# Patient Record
Sex: MALE | Race: WHITE | NOT HISPANIC OR LATINO | URBAN - METROPOLITAN AREA
[De-identification: names, ages, dates, MRNs, and addresses within clinical notes are randomized per-mention and may not be internally consistent; named-entity substitution may affect disease eponyms.]

---

## 2024-09-12 ENCOUNTER — OFFICE VISIT (OUTPATIENT)
Dept: UROLOGY | Facility: CLINIC | Age: 24
End: 2024-09-12
Payer: MEDICAID

## 2024-09-12 VITALS
SYSTOLIC BLOOD PRESSURE: 123 MMHG | HEIGHT: 66 IN | BODY MASS INDEX: 30.53 KG/M2 | RESPIRATION RATE: 16 BRPM | WEIGHT: 190 LBS | HEART RATE: 85 BPM | DIASTOLIC BLOOD PRESSURE: 84 MMHG

## 2024-09-12 DIAGNOSIS — N50.819 PAIN IN TESTICLE, UNSPECIFIED LATERALITY: Primary | ICD-10-CM

## 2024-09-12 LAB
BILIRUBIN, UA POC OHS: NEGATIVE
BLOOD, UA POC OHS: NEGATIVE
CLARITY, UA POC OHS: CLEAR
COLOR, UA POC OHS: YELLOW
GLUCOSE, UA POC OHS: NEGATIVE
KETONES, UA POC OHS: NEGATIVE
LEUKOCYTES, UA POC OHS: NEGATIVE
NITRITE, UA POC OHS: NEGATIVE
PH, UA POC OHS: 6.5
PROTEIN, UA POC OHS: NEGATIVE
SPECIFIC GRAVITY, UA POC OHS: 1.02
UROBILINOGEN, UA POC OHS: 0.2

## 2024-09-12 RX ORDER — SULFAMETHOXAZOLE AND TRIMETHOPRIM 800; 160 MG/1; MG/1
1 TABLET ORAL 2 TIMES DAILY
Qty: 28 TABLET | Refills: 0 | Status: SHIPPED | OUTPATIENT
Start: 2024-09-12 | End: 2024-09-26

## 2024-09-12 RX ORDER — SULFAMETHOXAZOLE AND TRIMETHOPRIM 800; 160 MG/1; MG/1
1 TABLET ORAL 2 TIMES DAILY
Qty: 14 TABLET | Refills: 0 | Status: SHIPPED | OUTPATIENT
Start: 2024-09-12 | End: 2024-09-12

## 2024-09-12 NOTE — PROGRESS NOTES
Subjective:       Patient ID: Nelson Santos is a 24 y.o. male.    Chief Complaint: Testicle Pain (Intermittent-Right)      HPI: 24-year-old male new patient presents today for recurrent testicular pain.  Patient has a history of testicular torsion 6 years ago on his right side.  Patient reports in January he developed an aching and throbbing pain in the right testicle and was treated with antibiotics at urgent care.  Patient developed symptoms again in May and again was treated with doxycycline which resolved his symptoms.  Patient reports 2 weeks ago he developed pain and discomfort in the right testicle again and was treated with moxifloxacin which did improve his symptoms.  Patient reports that with each episode his urinalysis has been negative for infection and he was also recently tested for STDs which were negative.  Patient had an ultrasound done in January which showed microcalcification on the right not typical of microlithiasis.  Small left hydrocele.  Left varicocele.  Possible partially thrombosed right inferior varicocele.    Patient reports that he previously was a  who lifting very heavy weights and also used significant amounts anabolic steroids.  He complains of a weak urinary stream with urinary frequency and nocturia 2-3 times per night.  He reports his stream is straight from start to finish.  He does report that he has some perineal pressure and rectal pressure at times but denies constipation.  He denies lower abdominal pain or pressure.       Past Medical History: History reviewed. No pertinent past medical history.    Past Surgical Historical:   Past Surgical History:   Procedure Laterality Date    ELBOW SURGERY      REPAIR OF TORSION OF TESTICLE Right     Age 17        Medications:   Medication List with Changes/Refills   New Medications    SULFAMETHOXAZOLE-TRIMETHOPRIM 800-160MG (BACTRIM DS) 800-160 MG TAB    Take 1 tablet by mouth 2 (two) times daily. for 7 days        Past  Social History:   Social History     Socioeconomic History    Marital status: Single   Tobacco Use    Smoking status: Never    Smokeless tobacco: Current   Substance and Sexual Activity    Alcohol use: Yes       Allergies: Review of patient's allergies indicates:  No Known Allergies     Family History: No family history on file.     Review of Systems:  Review of Systems   Constitutional: Negative.    HENT: Negative.     Eyes: Negative.    Respiratory: Negative.     Cardiovascular: Negative.    Gastrointestinal: Negative.    Endocrine: Negative.    Genitourinary:  Positive for frequency, scrotal swelling, testicular pain and urgency.        Perineal pressure, weak stream, nocturia 2-3 times   Musculoskeletal: Negative.    Skin: Negative.    Allergic/Immunologic: Negative.    Neurological: Negative.    Hematological: Negative.    Psychiatric/Behavioral: Negative.         Physical Exam:  Physical Exam  Constitutional:       Appearance: Normal appearance.   Cardiovascular:      Rate and Rhythm: Normal rate.   Pulmonary:      Effort: Pulmonary effort is normal.   Abdominal:      General: Bowel sounds are normal.      Palpations: Abdomen is soft.   Genitourinary:     Penis: Normal and circumcised.       Testes:         Right: Mass, tenderness and swelling present.   Neurological:      Mental Status: He is alert and oriented to person, place, and time.     Urinalysis: Negative  Assessment/Plan:     Recurrent Testicular pain/history of torsion:  Patient had an abnormal ultrasound in January with no follow up imaging.  He has also had multiple episodes of right-sided testicular pain in the last 9 months.  We will send him for a repeat ultrasound today due to abnormal physical assessment findings and his history of torsion.  Patient also has some symptoms of prostatitis so we will start him on 14 days of Bactrim.  We will discuss previous ultrasound findings as well as today's ultrasound findings with Dr. Bill gomez  obtained  Follow up 3-4 days after completion of Bactrim for re-evaluation  Problem List Items Addressed This Visit    None  Visit Diagnoses       Pain in testicle, unspecified laterality    -  Primary    Relevant Orders    US Scrotum And Testicles    POCT Urinalysis(Instrument)

## 2024-09-19 ENCOUNTER — TELEPHONE (OUTPATIENT)
Dept: UROLOGY | Facility: CLINIC | Age: 24
End: 2024-09-19
Payer: MEDICAID

## 2024-09-19 ENCOUNTER — PATIENT MESSAGE (OUTPATIENT)
Dept: UROLOGY | Facility: CLINIC | Age: 24
End: 2024-09-19
Payer: MEDICAID

## 2024-09-19 NOTE — TELEPHONE ENCOUNTER
Spoke with pt and informed of rsults. Message sent out to provider due to pain he is still having.    ----- Message from Sylvia Plata NP sent at 9/19/2024  8:05 AM CDT -----  Please call and inform patient that his scrotal ultrasound only indicated a small varicocele which is benign.  The previously noted thrombosed vein appears to have resolved as there is no mention of it on his ultrasound report.  Follow up as previously discussed

## 2024-10-03 ENCOUNTER — OFFICE VISIT (OUTPATIENT)
Dept: UROLOGY | Facility: CLINIC | Age: 24
End: 2024-10-03
Payer: MEDICAID

## 2024-10-03 VITALS
HEART RATE: 77 BPM | WEIGHT: 180 LBS | DIASTOLIC BLOOD PRESSURE: 63 MMHG | BODY MASS INDEX: 28.93 KG/M2 | HEIGHT: 66 IN | SYSTOLIC BLOOD PRESSURE: 124 MMHG

## 2024-10-03 DIAGNOSIS — R39.15 URINARY URGENCY: Primary | ICD-10-CM

## 2024-10-03 LAB
BILIRUBIN, UA POC OHS: NEGATIVE
BLOOD, UA POC OHS: NEGATIVE
CLARITY, UA POC OHS: CLEAR
COLOR, UA POC OHS: YELLOW
GLUCOSE, UA POC OHS: NEGATIVE
KETONES, UA POC OHS: NEGATIVE
LEUKOCYTES, UA POC OHS: NEGATIVE
NITRITE, UA POC OHS: NEGATIVE
PH, UA POC OHS: 5.5
PROTEIN, UA POC OHS: NEGATIVE
SPECIFIC GRAVITY, UA POC OHS: 1.02
UROBILINOGEN, UA POC OHS: 0.2

## 2024-10-03 PROCEDURE — 3078F DIAST BP <80 MM HG: CPT | Mod: CPTII,S$GLB,, | Performed by: UROLOGY

## 2024-10-03 PROCEDURE — 1159F MED LIST DOCD IN RCRD: CPT | Mod: CPTII,S$GLB,, | Performed by: UROLOGY

## 2024-10-03 PROCEDURE — 81003 URINALYSIS AUTO W/O SCOPE: CPT | Mod: QW,S$GLB,, | Performed by: UROLOGY

## 2024-10-03 PROCEDURE — 99214 OFFICE O/P EST MOD 30 MIN: CPT | Mod: S$GLB,,, | Performed by: UROLOGY

## 2024-10-03 PROCEDURE — 3008F BODY MASS INDEX DOCD: CPT | Mod: CPTII,S$GLB,, | Performed by: UROLOGY

## 2024-10-03 PROCEDURE — 3074F SYST BP LT 130 MM HG: CPT | Mod: CPTII,S$GLB,, | Performed by: UROLOGY

## 2024-10-03 NOTE — PROGRESS NOTES
Subjective:       Patient ID: Nelson Santos is a 24 y.o. male.    Chief Complaint: Urinary Frequency      HPI: 24-year-old male new patient presents today weak urinary stream with urinary frequency and nocturia 2-3 times per night. Patient reports that he previously was a  who lifting very heavy weights and also used significant amounts anabolic steroids.  He reports his stream is straight from start to finish.  He does report that he has some perineal pressure and rectal pressure at times but denies constipation.  He denies lower abdominal pain or pressure.    Urinary Frequency   Associated symptoms include frequency and urgency.        Past Medical History: History reviewed. No pertinent past medical history.    Past Surgical Historical:   Past Surgical History:   Procedure Laterality Date    ELBOW SURGERY      REPAIR OF TORSION OF TESTICLE Right     Age 17        Medications:   Medication List with Changes/Refills   Current Medications    MELOXICAM (MOBIC) 15 MG TABLET    Take 1 tablet (15 mg total) by mouth once daily. for 15 days        Past Social History:   Social History     Socioeconomic History    Marital status: Single   Tobacco Use    Smoking status: Never    Smokeless tobacco: Current   Substance and Sexual Activity    Alcohol use: Yes       Allergies: Review of patient's allergies indicates:  No Known Allergies     Family History: No family history on file.     Review of Systems:  Review of Systems   Constitutional: Negative.    HENT: Negative.     Eyes: Negative.    Respiratory: Negative.     Cardiovascular: Negative.    Gastrointestinal: Negative.    Endocrine: Negative.    Genitourinary:  Positive for difficulty urinating, frequency and urgency. Negative for scrotal swelling and testicular pain.        Perineal pressure, weak stream, nocturia 2-3 times   Musculoskeletal: Negative.    Skin: Negative.    Allergic/Immunologic: Negative.    Neurological: Negative.    Hematological:  Negative.    Psychiatric/Behavioral: Negative.         Physical Exam:  Physical Exam  Constitutional:       Appearance: Normal appearance.   Cardiovascular:      Rate and Rhythm: Normal rate.   Pulmonary:      Effort: Pulmonary effort is normal.   Abdominal:      General: Bowel sounds are normal.      Palpations: Abdomen is soft.   Genitourinary:     Penis: Normal and circumcised.       Testes:         Right: Mass, tenderness and swelling present.   Neurological:      Mental Status: He is alert and oriented to person, place, and time.       Urinalysis: Negative  Assessment/Plan:     Urinary frequency and urgency:  PVR 8 mL.  Patient complains of urgency and difficulty initiating urination.  We will set the patient up for cystoscopy for further evaluation.  Problem List Items Addressed This Visit    None  Visit Diagnoses       Urinary urgency    -  Primary    Relevant Orders    POCT Urinalysis(Instrument) (Completed)    Cystoscopy

## 2024-10-28 ENCOUNTER — TELEPHONE (OUTPATIENT)
Dept: UROLOGY | Facility: CLINIC | Age: 24
End: 2024-10-28
Payer: MEDICAID